# Patient Record
Sex: MALE | Race: ASIAN | NOT HISPANIC OR LATINO
[De-identification: names, ages, dates, MRNs, and addresses within clinical notes are randomized per-mention and may not be internally consistent; named-entity substitution may affect disease eponyms.]

---

## 2022-08-22 ENCOUNTER — APPOINTMENT (OUTPATIENT)
Dept: PODIATRY | Facility: CLINIC | Age: 52
End: 2022-08-22

## 2022-08-22 VITALS — HEIGHT: 68 IN | BODY MASS INDEX: 28.79 KG/M2 | WEIGHT: 190 LBS

## 2022-08-22 DIAGNOSIS — S92.152A DISPLACED AVULSION FRACTURE (CHIP FRACTURE) OF LEFT TALUS, INITIAL ENCOUNTER FOR CLOSED FRACTURE: ICD-10-CM

## 2022-08-22 PROCEDURE — 28400 CLTX CALCANEAL FX W/O MNPJ: CPT | Mod: LT

## 2022-08-22 PROCEDURE — 99204 OFFICE O/P NEW MOD 45 MIN: CPT | Mod: 57

## 2022-08-22 PROCEDURE — 73630 X-RAY EXAM OF FOOT: CPT | Mod: LT

## 2022-08-22 PROCEDURE — 28430 CLTX TALUS FRACTURE W/O MNPJ: CPT | Mod: 59,LT

## 2022-08-29 PROBLEM — S92.152A: Status: ACTIVE | Noted: 2022-08-25

## 2022-08-29 NOTE — PHYSICAL EXAM
[2+] : left foot dorsalis pedis 2+ [Sensation] : the sensory exam was normal to light touch and pinprick [No Focal Deficits] : no focal deficits [Deep Tendon Reflexes (DTR)] : deep tendon reflexes were 2+ and symmetric [Motor Exam] : the motor exam was normal [de-identified] : The right foot is normal.  The left foot shows decreased subtalar and ankle joint ROM due to swelling and pain.  Pain on palpation of the calcaneus and subtalar joint.  No pain on palpation at the ankle joint or upon ROM.  No crepitus, no clicking or ROM decreased due to pain.  No gross dislocation or deformity. [FreeTextEntry1] : Left hindfoot swelling with no open wounds.  No skin tenting.  All compartments are soft and supple.  No clinical signs of infection.

## 2022-08-29 NOTE — PROCEDURE
[FreeTextEntry1] : Impression: Pain, left foot (M79).  Calcaneal displaced fracture, initial (92.035A) .  Talar non-displaced fracture (92.152A).  All fractures are closed.  \par \par Treatment: Discussed the etiology and treatment options with the patient and his spouse. \par \par Reviewed [facility] CT exam, which showed non-displaced posterior talar fracture and multiple calcaneal fractures with mild joint depression.\par \par X-rays: (3 views - left foot) Normal angle of Gissane.  Decreased Bohlers angle.  Mild calcaneal widening and mild varus.  \par \par At this time, discussed risks and benefits or surgery with the patient for calcaneal ORIF.  Since patient is in the 4th week since the fracture, some fracture healing has taken place at this point.  Patient will still be at-risk for post traumatic osteoarthritis and he prefers not to undergo ORIF at this time.  \par Will continue conservative therapy with non-weight bearing in Cam boot until the follow up appointment at which time, x-rays will be taken.  \par Advised him to rest, elevate, apply a compression stocking or an Ace wrap to decrease swelling.  Take over-the-counter NSAIDs as needed for pain; currently he does not need any.  Apply ice as needed.  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  \par Patient is not a smoker; however, there is always risk with these types of fractures of delayed union, nonunion and need for surgery in the future.  \par Will reevaluate for post traumatic arthritis once the fracture has healed.  Patient may need subtalar joint fusion at that time, if he continues to have symptoms of pain in his daily activities.  \par Will reevaluate ii 2 weeks with x-rays.

## 2022-08-29 NOTE — PROCEDURE
[FreeTextEntry1] : Impression: Pain, left foot (M79).  Calcaneal displaced fracture, initial (92.035A) .  Talar non-displaced fracture (92.152A).  All fractures are closed.  \par \par Treatment: Discussed the etiology and treatment options with the patient and his spouse. \par \par Reviewed [facility] CT exam, which showed non-displaced posterior talar fracture and multiple calcaneal fractures with mild joint depression.\par \par X-rays: (3 views - left foot) Normal angle of Gissane.  Decreased Bohlers angle.  Mild calcaneal widening and mild varus.  \par \par At this time, discussed risks and benefits or surgery with the patient for calcaneal ORIF.  Since patient is in the 4th week since the fracture, some fracture healing has taken place at this point.  Patient will still be at-risk for post traumatic osteoarthritis and he prefers not to undergo ORIF at this time.  \par Will continue conservative therapy with non-weight bearing in Cam boot until the follow up appointment at which time, x-rays will be taken.  \par Advised him to rest, elevate, apply a compression stocking or an Ace wrap to decrease swelling.  Take over-the-counter NSAIDs as needed for pain; currently he does not need any.  Apply ice as needed.  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  \par Patient is not a smoker; however, there is always risk with these types of fractures of delayed union, nonunion and need for surgery in the future.  \par Will reevaluate for post traumatic arthritis once the fracture has healed.  Patient may need subtalar joint fusion at that time, if he continues to have symptoms of pain in his daily activities.  \par Will reevaluate ii 2 weeks with x-rays.

## 2022-08-29 NOTE — HISTORY OF PRESENT ILLNESS
[FreeTextEntry1] : Patient presents today for left foot pain, left calcaneal and talar fracture.  Patient slipped on his ladder, at home on 07/29/22.  He sustained a fracture.  He went to [facility] emergency room where he had x-rays and was placed in a Cam boot and he was told that he had a heel fracture.  Patient went for a CT scan at [facility] and is here for review.  Patient has been non-weight bearing with crutches in the tall Cam boot, left.  Patient reports swelling is unchanged.  Pain has improved.  He was taking Naprosyn before and now no longer has to take it for pain.  Patient has not been walking and mostly resting at home with elevation.

## 2022-08-29 NOTE — PHYSICAL EXAM
[2+] : left foot dorsalis pedis 2+ [Sensation] : the sensory exam was normal to light touch and pinprick [No Focal Deficits] : no focal deficits [Deep Tendon Reflexes (DTR)] : deep tendon reflexes were 2+ and symmetric [Motor Exam] : the motor exam was normal [de-identified] : The right foot is normal.  The left foot shows decreased subtalar and ankle joint ROM due to swelling and pain.  Pain on palpation of the calcaneus and subtalar joint.  No pain on palpation at the ankle joint or upon ROM.  No crepitus, no clicking or ROM decreased due to pain.  No gross dislocation or deformity. [FreeTextEntry1] : Left hindfoot swelling with no open wounds.  No skin tenting.  All compartments are soft and supple.  No clinical signs of infection.

## 2022-08-29 NOTE — HISTORY OF PRESENT ILLNESS
[FreeTextEntry1] : Patient presents today for left foot pain, left calcaneal and talar fracture.  Patient slipped on his ladder, at home on 07/29/22.  He sustained a fracture.  He went to [facility] emergency room where he had x-rays and was placed in a Cam boot and he was told that he had a heel fracture.  Patient went for a CT scan at [facility] and is here for review.  Patient has been non-weight bearing with crutches in the tall Cam boot, left.  Patient reports swelling is unchanged.  Pain has improved.  He was taking Naprosyn before and now no longer has to take it for pain.  Patient has not been walking and mostly resting at home with elevation.

## 2022-09-06 ENCOUNTER — APPOINTMENT (OUTPATIENT)
Dept: PODIATRY | Facility: CLINIC | Age: 52
End: 2022-09-06

## 2022-09-06 VITALS — BODY MASS INDEX: 28.79 KG/M2 | WEIGHT: 190 LBS | HEIGHT: 68 IN

## 2022-09-06 PROCEDURE — 73630 X-RAY EXAM OF FOOT: CPT | Mod: LT

## 2022-09-12 NOTE — HISTORY OF PRESENT ILLNESS
[CAM Boot] : a CAM boot [FreeTextEntry1] : Patient presents today for follow up of left calcaneal intra-articular fracture and talar body, nondisplaced fracture. Patient has been in a CAM boot with crutches. He states he occasionally puts his foot down and weight-bears inside the apartment. The swelling has been slowly improving and so has the pain.

## 2022-09-12 NOTE — ASSESSMENT
[FreeTextEntry1] : \par Treatment: Patient was advised to begin weight-bearing in the CAM boot with protection with crutches for support. Will reevaluate back in 2 weeks.

## 2022-09-12 NOTE — PROCEDURE
[FreeTextEntry1] : X-ray Report: Left foot, weight-bearing, 3 views were taken which shows consolidation at the fracture lines. No new acute fractures. No further displacement.

## 2022-09-12 NOTE — PHYSICAL EXAM
[2+] : left foot dorsalis pedis 2+ [Sensation] : the sensory exam was normal to light touch and pinprick [No Focal Deficits] : no focal deficits [Deep Tendon Reflexes (DTR)] : deep tendon reflexes were 2+ and symmetric [Motor Exam] : the motor exam was normal [Ankle Swelling (On Exam)] : not present [Varicose Veins Of Lower Extremities] : not present [] : not present [FreeTextEntry3] : CFT: < 3 seconds. Normal temperature gradient. [de-identified] : The right foot is normal. The left foot shows decreased subtalar and ankle joint ROM due to swelling and pain. Pain on palpation of the calcaneus and subtalar joint. No pain on palpation at the ankle joint or upon ROM. No crepitus, no clicking or ROM decreased due to pain. No gross dislocation or deformity. [FreeTextEntry1] : Swelling has decreased.

## 2022-09-24 ENCOUNTER — APPOINTMENT (OUTPATIENT)
Dept: PODIATRY | Facility: CLINIC | Age: 52
End: 2022-09-24

## 2022-09-24 VITALS — BODY MASS INDEX: 28.79 KG/M2 | WEIGHT: 190 LBS | HEIGHT: 68 IN

## 2022-09-24 DIAGNOSIS — S92.155D NONDISPLACED AVULSION FRACTURE (CHIP FRACTURE) OF LEFT TALUS, SUBSEQUENT ENCOUNTER FOR FRACTURE WITH ROUTINE HEALING: ICD-10-CM

## 2022-09-24 DIAGNOSIS — S92.009A UNSPECIFIED FRACTURE OF UNSPECIFIED CALCANEUS, INITIAL ENCOUNTER FOR CLOSED FRACTURE: ICD-10-CM

## 2022-09-24 PROCEDURE — 73630 X-RAY EXAM OF FOOT: CPT | Mod: LT

## 2022-09-27 PROBLEM — S92.155D: Status: ACTIVE | Noted: 2022-09-08

## 2022-09-27 PROBLEM — S92.009A CALCANEUS FRACTURE: Status: ACTIVE | Noted: 2022-09-08

## 2022-09-28 NOTE — HISTORY OF PRESENT ILLNESS
[CAM Boot] : a CAM boot [Crutches] : crutches [FreeTextEntry1] : Patient returns today for follow up of left talar body, nondisplaced fracture and left calcaneal posterior facet mildly displaced fracture. The patient has been in the CAM boot with some weight-bearing with a crutch. He has not been working. He says the pain has pretty much resolved, he just gets occasional swelling if he is on his foot if he is on his feet for prolonged periods of time. Injury sustained on 7/28/2022.

## 2022-09-28 NOTE — ASSESSMENT
[FreeTextEntry1] : \par Impression: Left calcaneal mildly displaced fracture with routine healing left talar nondisplaced fracture with routine healing. \par \par Treatment: At this time I advised patient to start transitioning into a running style sneaker. He can use a crutch for support if he feels he needs it. 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 Will see patient back in 2 weeks.

## 2022-09-28 NOTE — PROCEDURE
[FreeTextEntry1] : X-ray Report: X-ray of the left foot, 3 views weight-bearing, were performed which shows consolidated talar body fracture, nondisplaced and consolidated calcaneal posterior facet with mild depression fracture. No new fractures or dislocations. No erosions. Decrease in subtalar joint space.

## 2022-09-28 NOTE — PHYSICAL EXAM
[2+] : left foot dorsalis pedis 2+ [Skin Color & Pigmentation] : normal skin color and pigmentation [Skin Turgor] : normal skin turgor [Skin Lesions] : no skin lesions [Sensation] : the sensory exam was normal to light touch and pinprick [No Focal Deficits] : no focal deficits [Deep Tendon Reflexes (DTR)] : deep tendon reflexes were 2+ and symmetric [Motor Exam] : the motor exam was normal [Ankle Swelling (On Exam)] : not present [Varicose Veins Of Lower Extremities] : not present [] : not present [FreeTextEntry1] : No telangiectasias. No discoloration. [FreeTextEntry3] : CFT: < 3 seconds. Normal temperature gradient. [de-identified] : Left ankle joint normal ROM, nonpainful, non-crepitant. Subtalar joint mild decrease in ROM and also non-painful, non-crepitant. All other pedal joint ranges of motion are normal. Muscle power is 5/5 of all pedal groups. No swelling is present. Mild pain on palpation, actually more of a discomfort on palpation of the left talus.  No pain on palpation of the calcaneus.  [Foot Ulcer] : no foot ulcer [Skin Induration] : no skin induration

## 2022-10-08 ENCOUNTER — APPOINTMENT (OUTPATIENT)
Dept: PODIATRY | Facility: CLINIC | Age: 52
End: 2022-10-08

## 2022-10-22 ENCOUNTER — APPOINTMENT (OUTPATIENT)
Dept: PODIATRY | Facility: CLINIC | Age: 52
End: 2022-10-22

## 2022-10-22 PROCEDURE — 73630 X-RAY EXAM OF FOOT: CPT | Mod: LT

## 2022-10-31 NOTE — ASSESSMENT
[FreeTextEntry1] : Impression: Pain, left foot (M79).  Calcaneal displaced fracture, left (92.035A) .  Talar non-displaced fracture (92.152A).  All fractures are closed.  \par \par Treatment: Advised patient to get a work boot that laces high and start walking in it as he has to get used to it prior to returning to work.  He can return to work on 11/01/22.  Meanwhile, continue physical therapy.\par Return: one month.

## 2022-10-31 NOTE — HISTORY OF PRESENT ILLNESS
[Sneakers] : edilberto [FreeTextEntry1] : Patient returns today for management of left calcaneal fracture that he sustained on 07/28/22.  It is being treated conservatively.  Patient has been ambulating in supportive sneakers and going to physical therapy.  He states that he feels good but he sometimes starts to limp.  Otherwise, he feels strong on the foot and he has no pain.  \par

## 2022-10-31 NOTE — PROCEDURE
[FreeTextEntry1] : X-rays: (3 views - left foot, weight bearing) Consolidated posterior facet depression fracture with no further loss of height compared to prior imaging.

## 2022-10-31 NOTE — REASON FOR VISIT
[Follow-Up Visit] : a follow-up visit for [Confirmed Foot Fracture] : confirmed foot fracture [FreeTextEntry2] : left

## 2022-10-31 NOTE — PHYSICAL EXAM
[2+] : left foot dorsalis pedis 2+ [Sensation] : the sensory exam was normal to light touch and pinprick [No Focal Deficits] : no focal deficits [Deep Tendon Reflexes (DTR)] : deep tendon reflexes were 2+ and symmetric [Motor Exam] : the motor exam was normal [de-identified] : Left foot muscle power is 5/5.  Ankle ROM and metatarsal motion is normal.  Subtalar joint ROM is mildly decreased with no pain or crepitus.  Swelling around the peroneal tendons with no pain and no dislocation of peroneal tendons on ROM.   [FreeTextEntry1] : Left hindfoot swelling with no open wounds.  No skin tenting.  All compartments are soft and supple.  No clinical signs of infection.

## 2022-11-26 ENCOUNTER — APPOINTMENT (OUTPATIENT)
Dept: PODIATRY | Facility: CLINIC | Age: 52
End: 2022-11-26

## 2022-11-26 DIAGNOSIS — S92.035A: ICD-10-CM

## 2022-11-26 PROCEDURE — 99212 OFFICE O/P EST SF 10 MIN: CPT

## 2022-11-30 PROBLEM — S92.035A: Status: ACTIVE | Noted: 2022-08-25

## 2022-12-01 NOTE — PHYSICAL EXAM
[2+] : left foot dorsalis pedis 2+ [Sensation] : the sensory exam was normal to light touch and pinprick [No Focal Deficits] : no focal deficits [Deep Tendon Reflexes (DTR)] : deep tendon reflexes were 2+ and symmetric [Motor Exam] : the motor exam was normal [de-identified] : Left pedal  muscle power: 5/5 of all pedal groups.  ROM is normal as well as the midtarsal ROM.  Subtalar joint has mild decrease in ROM on eversion.  There is no pain or crepitus during joint testing.  There is mild swelling around the lateral heel.  No pain on palpation of the peroneal tendons or with peroneal muscle testing.   [FreeTextEntry1] : Left hindfoot swelling with no open wounds.  No skin tenting.  All compartments are soft and supple.  No clinical signs of infection.

## 2022-12-01 NOTE — ASSESSMENT
[FreeTextEntry1] : Impression: Calcaneal fracture, left, displaced, healed (S92.035A)  Talar non-displaced fracture, closed, initial, routine healing (92.152A). \par \par Treatment: I renewed patient's physical therapy.  Advised him to do low impact physical activities outside of work, such as walking, bicycle and elliptical.  Advised against high impact activities such as running, jumping or high impact sports such as tennis.  Advised him to do the physical therapy exercises at home after work as well as once a week may not be sufficient to improve his symptoms.\par Return: 2 months or earlier if he has pain in the left subtalar joint.  \par \par